# Patient Record
(demographics unavailable — no encounter records)

---

## 2025-01-07 NOTE — HISTORY OF PRESENT ILLNESS
[FreeTextEntry1] : This is a 42 y/o male with a pmhx of HTN, anxiety/depression here today for an annual wellness exam. Patient states he stopped taking amlodipine. He also stopped lexapro as he did not like how he felt on the medication. He continues taking xanax daily, follows with psych. Patient reports feeling "foggy lately", admits to difficult concentrating. Patient with recent vision changes. Patient with easy bruising. He admits to mild REYES.  Patient denies chest pain, palpitations, dizziness, syncope, changes in bowel/bladder habits or appetite. pt also with nodule on scalp

## 2025-01-07 NOTE — PHYSICAL EXAM
[Well Developed] : well developed [Well Nourished] : well nourished [No Acute Distress] : no acute distress [Normal Conjunctiva] : normal conjunctiva [Normal Venous Pressure] : normal venous pressure [No Carotid Bruit] : no carotid bruit [Normal S1, S2] : normal S1, S2 [No Murmur] : no murmur [No Rub] : no rub [No Gallop] : no gallop [Clear Lung Fields] : clear lung fields [Good Air Entry] : good air entry [No Respiratory Distress] : no respiratory distress  [Soft] : abdomen soft [Non Tender] : non-tender [No Masses/organomegaly] : no masses/organomegaly [Normal Bowel Sounds] : normal bowel sounds [Normal Gait] : normal gait [No Edema] : no edema [No Cyanosis] : no cyanosis [No Clubbing] : no clubbing [No Varicosities] : no varicosities [No Rash] : no rash [No Skin Lesions] : no skin lesions [Moves all extremities] : moves all extremities [No Focal Deficits] : no focal deficits [Normal Speech] : normal speech [Alert and Oriented] : alert and oriented [Normal memory] : normal memory [de-identified] : scalp nodule left center front  [de-identified] : normal genitals  [de-identified] : jamshid

## 2025-01-07 NOTE — REVIEW OF SYSTEMS
[Negative] : Heme/Lymph [Anxiety] : anxiety [FreeTextEntry3] : see hpi [FreeTextEntry5] : see hpi [de-identified] : see hpi

## 2025-02-13 NOTE — HISTORY OF PRESENT ILLNESS
[FreeTextEntry1] : This is a 42 y/o male with a pmhx of HTN, anxiety/depression here today for a follow up. Last seen 1/7/25 started on zoloft 50 mg po qd. He reports in the first few weeks he felt fatigued, but improved.  He was also restarted on amlodipine 5 mg for HTN. Patient also reported REYES. CXR 1/7/25 normal. Echo 1/2025 with normal EF, trace-mild MR. Normal EST 1/2025.  Patient recently seen at urgent care for dysuria. started on macrobid, culture was negative and antibiotic was d/c.  Patient with mild REYES. Patient denies chest pain,  palpitations, dizziness, syncope, changes in bowel/bladder habits or appetite.